# Patient Record
Sex: MALE | URBAN - METROPOLITAN AREA
[De-identification: names, ages, dates, MRNs, and addresses within clinical notes are randomized per-mention and may not be internally consistent; named-entity substitution may affect disease eponyms.]

---

## 2018-12-04 ENCOUNTER — APPOINTMENT (RX ONLY)
Dept: URBAN - METROPOLITAN AREA CLINIC 149 | Facility: CLINIC | Age: 73
Setting detail: DERMATOLOGY
End: 2018-12-04

## 2018-12-04 DIAGNOSIS — L82.1 OTHER SEBORRHEIC KERATOSIS: ICD-10-CM

## 2018-12-04 DIAGNOSIS — D22 MELANOCYTIC NEVI: ICD-10-CM

## 2018-12-04 DIAGNOSIS — Z85.828 PERSONAL HISTORY OF OTHER MALIGNANT NEOPLASM OF SKIN: ICD-10-CM | Status: RESOLVED

## 2018-12-04 DIAGNOSIS — L81.4 OTHER MELANIN HYPERPIGMENTATION: ICD-10-CM

## 2018-12-04 DIAGNOSIS — D18.0 HEMANGIOMA: ICD-10-CM

## 2018-12-04 DIAGNOSIS — L57.0 ACTINIC KERATOSIS: ICD-10-CM

## 2018-12-04 PROBLEM — D22.5 MELANOCYTIC NEVI OF TRUNK: Status: ACTIVE | Noted: 2018-12-04

## 2018-12-04 PROBLEM — D18.01 HEMANGIOMA OF SKIN AND SUBCUTANEOUS TISSUE: Status: ACTIVE | Noted: 2018-12-04

## 2018-12-04 PROCEDURE — 17003 DESTRUCT PREMALG LES 2-14: CPT

## 2018-12-04 PROCEDURE — 17000 DESTRUCT PREMALG LESION: CPT

## 2018-12-04 PROCEDURE — ? SUNSCREEN RECOMMENDATIONS

## 2018-12-04 PROCEDURE — ? LIQUID NITROGEN

## 2018-12-04 PROCEDURE — 99214 OFFICE O/P EST MOD 30 MIN: CPT | Mod: 25

## 2018-12-04 PROCEDURE — ? COUNSELING

## 2018-12-04 ASSESSMENT — LOCATION SIMPLE DESCRIPTION DERM
LOCATION SIMPLE: LEFT TEMPLE
LOCATION SIMPLE: ABDOMEN
LOCATION SIMPLE: RIGHT POSTAURICULAR SKIN
LOCATION SIMPLE: LEFT CHEEK
LOCATION SIMPLE: LEFT UPPER BACK
LOCATION SIMPLE: RIGHT SHOULDER
LOCATION SIMPLE: SCALP

## 2018-12-04 ASSESSMENT — LOCATION DETAILED DESCRIPTION DERM
LOCATION DETAILED: RIGHT CENTRAL POSTAURICULAR SKIN
LOCATION DETAILED: PERIUMBILICAL SKIN
LOCATION DETAILED: RIGHT INFERIOR POSTAURICULAR SKIN
LOCATION DETAILED: EPIGASTRIC SKIN
LOCATION DETAILED: LEFT CENTRAL MALAR CHEEK
LOCATION DETAILED: RIGHT POSTERIOR SHOULDER
LOCATION DETAILED: LEFT SUPERIOR LATERAL UPPER BACK
LOCATION DETAILED: LEFT LATERAL TEMPLE

## 2018-12-04 ASSESSMENT — LOCATION ZONE DERM
LOCATION ZONE: ARM
LOCATION ZONE: TRUNK
LOCATION ZONE: SCALP
LOCATION ZONE: FACE

## 2018-12-04 NOTE — PROCEDURE: LIQUID NITROGEN
Duration Of Freeze Thaw-Cycle (Seconds): 0
Consent: The patient's consent was obtained including but not limited to risks of crusting, scabbing, blistering, scarring, darker or lighter pigmentary change, recurrence, incomplete removal and infection.
Number Of Freeze-Thaw Cycles: 2 freeze-thaw cycles
Render Post-Care Instructions In Note?: yes
Detail Level: Simple
Post-Care Instructions: I reviewed with the patient in detail post-care instructions. Patient is to wear sunprotection, and avoid picking at any of the treated lesions. Pt may apply Vaseline to crusted or scabbing areas.

## 2019-04-08 ENCOUNTER — APPOINTMENT (RX ONLY)
Dept: URBAN - METROPOLITAN AREA CLINIC 149 | Facility: CLINIC | Age: 74
Setting detail: DERMATOLOGY
End: 2019-04-08

## 2019-04-08 DIAGNOSIS — L57.0 ACTINIC KERATOSIS: ICD-10-CM

## 2019-04-08 DIAGNOSIS — Z85.828 PERSONAL HISTORY OF OTHER MALIGNANT NEOPLASM OF SKIN: ICD-10-CM | Status: RESOLVED

## 2019-04-08 DIAGNOSIS — D18.0 HEMANGIOMA: ICD-10-CM

## 2019-04-08 DIAGNOSIS — D22 MELANOCYTIC NEVI: ICD-10-CM

## 2019-04-08 DIAGNOSIS — L82.1 OTHER SEBORRHEIC KERATOSIS: ICD-10-CM

## 2019-04-08 DIAGNOSIS — L81.4 OTHER MELANIN HYPERPIGMENTATION: ICD-10-CM

## 2019-04-08 PROBLEM — D22.9 MELANOCYTIC NEVI, UNSPECIFIED: Status: ACTIVE | Noted: 2019-04-08

## 2019-04-08 PROBLEM — D18.01 HEMANGIOMA OF SKIN AND SUBCUTANEOUS TISSUE: Status: ACTIVE | Noted: 2019-04-08

## 2019-04-08 PROCEDURE — 99214 OFFICE O/P EST MOD 30 MIN: CPT | Mod: 25

## 2019-04-08 PROCEDURE — 17004 DESTROY PREMAL LESIONS 15/>: CPT

## 2019-04-08 PROCEDURE — ? SUNSCREEN RECOMMENDATIONS

## 2019-04-08 PROCEDURE — ? PRESCRIPTION

## 2019-04-08 PROCEDURE — ? COUNSELING

## 2019-04-08 PROCEDURE — ? LIQUID NITROGEN

## 2019-04-08 RX ORDER — FLUOROURACIL 20 MG/ML
SOLUTION TOPICAL
Qty: 2 | Refills: 0 | Status: ERX | COMMUNITY
Start: 2019-04-08

## 2019-04-08 RX ADMIN — FLUOROURACIL: 20 SOLUTION TOPICAL at 00:00

## 2019-04-08 ASSESSMENT — LOCATION DETAILED DESCRIPTION DERM
LOCATION DETAILED: LEFT LATERAL FRONTAL SCALP
LOCATION DETAILED: LEFT FOREHEAD
LOCATION DETAILED: LEFT CENTRAL TEMPLE
LOCATION DETAILED: SUPERIOR MID FOREHEAD
LOCATION DETAILED: LEFT MEDIAL TEMPLE
LOCATION DETAILED: LEFT SUPERIOR FOREHEAD
LOCATION DETAILED: LEFT CENTRAL FRONTAL SCALP
LOCATION DETAILED: LEFT MEDIAL ZYGOMA
LOCATION DETAILED: MEDIAL FRONTAL SCALP
LOCATION DETAILED: LEFT LATERAL ZYGOMA

## 2019-04-08 ASSESSMENT — LOCATION SIMPLE DESCRIPTION DERM
LOCATION SIMPLE: FRONTAL SCALP
LOCATION SIMPLE: SUPERIOR FOREHEAD
LOCATION SIMPLE: LEFT TEMPLE
LOCATION SIMPLE: LEFT ZYGOMA
LOCATION SIMPLE: LEFT FOREHEAD
LOCATION SIMPLE: LEFT SCALP

## 2019-04-08 ASSESSMENT — LOCATION ZONE DERM
LOCATION ZONE: FACE
LOCATION ZONE: SCALP

## 2019-04-08 NOTE — PROCEDURE: LIQUID NITROGEN
Render Post-Care Instructions In Note?: yes
Consent: The patient's consent was obtained including but not limited to risks of crusting, scabbing, blistering, scarring, darker or lighter pigmentary change, recurrence, incomplete removal and infection.
Number Of Freeze-Thaw Cycles: 2 freeze-thaw cycles
Detail Level: Detailed
Post-Care Instructions: I reviewed with the patient in detail post-care instructions. Patient is to wear sunprotection, and avoid picking at any of the treated lesions. Pt may apply Vaseline to crusted or scabbing areas.
Duration Of Freeze Thaw-Cycle (Seconds): 0

## 2020-01-16 ENCOUNTER — APPOINTMENT (RX ONLY)
Dept: URBAN - METROPOLITAN AREA CLINIC 149 | Facility: CLINIC | Age: 75
Setting detail: DERMATOLOGY
End: 2020-01-16

## 2020-01-16 DIAGNOSIS — D22 MELANOCYTIC NEVI: ICD-10-CM

## 2020-01-16 DIAGNOSIS — L81.4 OTHER MELANIN HYPERPIGMENTATION: ICD-10-CM

## 2020-01-16 DIAGNOSIS — Z85.828 PERSONAL HISTORY OF OTHER MALIGNANT NEOPLASM OF SKIN: ICD-10-CM

## 2020-01-16 DIAGNOSIS — L82.1 OTHER SEBORRHEIC KERATOSIS: ICD-10-CM

## 2020-01-16 DIAGNOSIS — D18.0 HEMANGIOMA: ICD-10-CM

## 2020-01-16 DIAGNOSIS — L57.0 ACTINIC KERATOSIS: ICD-10-CM

## 2020-01-16 PROBLEM — D22.9 MELANOCYTIC NEVI, UNSPECIFIED: Status: ACTIVE | Noted: 2020-01-16

## 2020-01-16 PROBLEM — D18.01 HEMANGIOMA OF SKIN AND SUBCUTANEOUS TISSUE: Status: ACTIVE | Noted: 2020-01-16

## 2020-01-16 PROCEDURE — ? SUNSCREEN RECOMMENDATIONS

## 2020-01-16 PROCEDURE — ? COUNSELING

## 2020-01-16 PROCEDURE — 99214 OFFICE O/P EST MOD 30 MIN: CPT | Mod: 25

## 2020-01-16 PROCEDURE — ? LIQUID NITROGEN

## 2020-01-16 PROCEDURE — 17004 DESTROY PREMAL LESIONS 15/>: CPT

## 2020-01-16 ASSESSMENT — LOCATION ZONE DERM
LOCATION ZONE: SCALP
LOCATION ZONE: ARM
LOCATION ZONE: FACE
LOCATION ZONE: NOSE

## 2020-01-16 ASSESSMENT — LOCATION DETAILED DESCRIPTION DERM
LOCATION DETAILED: LEFT SUPERIOR FOREHEAD
LOCATION DETAILED: RIGHT MEDIAL FRONTAL SCALP
LOCATION DETAILED: RIGHT DISTAL DORSAL FOREARM
LOCATION DETAILED: NASAL SUPRATIP
LOCATION DETAILED: LEFT INFERIOR FOREHEAD
LOCATION DETAILED: RIGHT CENTRAL FRONTAL SCALP
LOCATION DETAILED: RIGHT CENTRAL PARIETAL SCALP
LOCATION DETAILED: LEFT DORSAL WRIST
LOCATION DETAILED: LEFT INFERIOR CENTRAL MALAR CHEEK
LOCATION DETAILED: LEFT CENTRAL PARIETAL SCALP
LOCATION DETAILED: LEFT CENTRAL FRONTAL SCALP
LOCATION DETAILED: LEFT LATERAL MALAR CHEEK
LOCATION DETAILED: RIGHT LATERAL FOREHEAD
LOCATION DETAILED: LEFT MEDIAL TEMPLE
LOCATION DETAILED: LEFT SUPERIOR CENTRAL MALAR CHEEK
LOCATION DETAILED: RIGHT SUPERIOR PARIETAL SCALP
LOCATION DETAILED: RIGHT DORSAL WRIST
LOCATION DETAILED: LEFT SUPERIOR PARIETAL SCALP

## 2020-01-16 ASSESSMENT — LOCATION SIMPLE DESCRIPTION DERM
LOCATION SIMPLE: SCALP
LOCATION SIMPLE: RIGHT WRIST
LOCATION SIMPLE: RIGHT FOREHEAD
LOCATION SIMPLE: LEFT WRIST
LOCATION SIMPLE: RIGHT SCALP
LOCATION SIMPLE: LEFT FOREHEAD
LOCATION SIMPLE: LEFT SCALP
LOCATION SIMPLE: LEFT TEMPLE
LOCATION SIMPLE: LEFT CHEEK
LOCATION SIMPLE: RIGHT FOREARM
LOCATION SIMPLE: NOSE

## 2020-01-16 NOTE — PROCEDURE: LIQUID NITROGEN
Detail Level: Simple
Render Note In Bullet Format When Appropriate: No
Number Of Freeze-Thaw Cycles: 2 freeze-thaw cycles
Consent: The patient's consent was obtained including but not limited to risks of crusting, scabbing, blistering, scarring, darker or lighter pigmentary change, recurrence, incomplete removal and infection.
Post-Care Instructions: I reviewed with the patient in detail post-care instructions. Patient is to wear sunprotection, and avoid picking at any of the treated lesions. Pt may apply Vaseline to crusted or scabbing areas.
Duration Of Freeze Thaw-Cycle (Seconds): 3

## 2020-11-17 ENCOUNTER — APPOINTMENT (RX ONLY)
Dept: URBAN - METROPOLITAN AREA CLINIC 149 | Facility: CLINIC | Age: 75
Setting detail: DERMATOLOGY
End: 2020-11-17

## 2020-11-17 VITALS — TEMPERATURE: 96.5 F

## 2020-11-17 DIAGNOSIS — L82.1 OTHER SEBORRHEIC KERATOSIS: ICD-10-CM

## 2020-11-17 DIAGNOSIS — L81.4 OTHER MELANIN HYPERPIGMENTATION: ICD-10-CM

## 2020-11-17 DIAGNOSIS — D18.0 HEMANGIOMA: ICD-10-CM

## 2020-11-17 DIAGNOSIS — D22 MELANOCYTIC NEVI: ICD-10-CM

## 2020-11-17 DIAGNOSIS — L57.0 ACTINIC KERATOSIS: ICD-10-CM

## 2020-11-17 DIAGNOSIS — Z85.828 PERSONAL HISTORY OF OTHER MALIGNANT NEOPLASM OF SKIN: ICD-10-CM | Status: RESOLVED

## 2020-11-17 PROBLEM — D22.5 MELANOCYTIC NEVI OF TRUNK: Status: ACTIVE | Noted: 2020-11-17

## 2020-11-17 PROBLEM — D18.01 HEMANGIOMA OF SKIN AND SUBCUTANEOUS TISSUE: Status: ACTIVE | Noted: 2020-11-17

## 2020-11-17 PROCEDURE — 17000 DESTRUCT PREMALG LESION: CPT

## 2020-11-17 PROCEDURE — 17003 DESTRUCT PREMALG LES 2-14: CPT

## 2020-11-17 PROCEDURE — 99213 OFFICE O/P EST LOW 20 MIN: CPT | Mod: 25

## 2020-11-17 PROCEDURE — ? LIQUID NITROGEN

## 2020-11-17 PROCEDURE — ? SUNSCREEN RECOMMENDATIONS

## 2020-11-17 PROCEDURE — ? FULL BODY SKIN EXAM

## 2020-11-17 PROCEDURE — ? COUNSELING

## 2020-11-17 PROCEDURE — ? ADDITIONAL NOTES

## 2020-11-17 ASSESSMENT — LOCATION SIMPLE DESCRIPTION DERM
LOCATION SIMPLE: RIGHT UPPER BACK
LOCATION SIMPLE: POSTERIOR SCALP
LOCATION SIMPLE: LEFT UPPER BACK
LOCATION SIMPLE: LEFT FOREHEAD
LOCATION SIMPLE: RIGHT FOREARM
LOCATION SIMPLE: RIGHT SCALP
LOCATION SIMPLE: SCALP
LOCATION SIMPLE: RIGHT SHOULDER
LOCATION SIMPLE: RIGHT BREAST
LOCATION SIMPLE: LEFT FOREARM

## 2020-11-17 ASSESSMENT — LOCATION DETAILED DESCRIPTION DERM
LOCATION DETAILED: RIGHT MEDIAL FRONTAL SCALP
LOCATION DETAILED: LEFT PROXIMAL DORSAL FOREARM
LOCATION DETAILED: LEFT SUPERIOR PARIETAL SCALP
LOCATION DETAILED: RIGHT INFRAMAMMARY CREASE (INNER QUADRANT)
LOCATION DETAILED: LEFT SUPERIOR FOREHEAD
LOCATION DETAILED: RIGHT PROXIMAL DORSAL FOREARM
LOCATION DETAILED: LEFT SUPERIOR LATERAL FOREHEAD
LOCATION DETAILED: POSTERIOR MID-PARIETAL SCALP
LOCATION DETAILED: RIGHT ANTERIOR SHOULDER
LOCATION DETAILED: RIGHT SUPERIOR UPPER BACK
LOCATION DETAILED: RIGHT SUPERIOR PARIETAL SCALP
LOCATION DETAILED: LEFT SUPERIOR UPPER BACK

## 2020-11-17 ASSESSMENT — LOCATION ZONE DERM
LOCATION ZONE: SCALP
LOCATION ZONE: ARM
LOCATION ZONE: TRUNK
LOCATION ZONE: FACE

## 2020-11-17 NOTE — PROCEDURE: LIQUID NITROGEN
Render Note In Bullet Format When Appropriate: No
Detail Level: Detailed
Number Of Freeze-Thaw Cycles: 2 freeze-thaw cycles
Post-Care Instructions: I reviewed with the patient in detail post-care instructions. Patient is to wear sunprotection, and avoid picking at any of the treated lesions. Pt may apply Vaseline to crusted or scabbing areas.
Render Post-Care Instructions In Note?: yes
Consent: The patient's consent was obtained including but not limited to risks of crusting, scabbing, blistering, scarring, darker or lighter pigmentary change, recurrence, incomplete removal and infection.
Duration Of Freeze Thaw-Cycle (Seconds): 0

## 2021-03-30 ENCOUNTER — APPOINTMENT (RX ONLY)
Dept: URBAN - METROPOLITAN AREA CLINIC 149 | Facility: CLINIC | Age: 76
Setting detail: DERMATOLOGY
End: 2021-03-30

## 2021-03-30 VITALS — TEMPERATURE: 94.7 F

## 2021-03-30 DIAGNOSIS — L81.4 OTHER MELANIN HYPERPIGMENTATION: ICD-10-CM

## 2021-03-30 DIAGNOSIS — B07.8 OTHER VIRAL WARTS: ICD-10-CM

## 2021-03-30 DIAGNOSIS — L57.0 ACTINIC KERATOSIS: ICD-10-CM

## 2021-03-30 DIAGNOSIS — L82.1 OTHER SEBORRHEIC KERATOSIS: ICD-10-CM

## 2021-03-30 PROCEDURE — ? FULL BODY SKIN EXAM - DECLINED

## 2021-03-30 PROCEDURE — ? COUNSELING

## 2021-03-30 PROCEDURE — 17110 DESTRUCTION B9 LES UP TO 14: CPT

## 2021-03-30 PROCEDURE — ? ADDITIONAL NOTES

## 2021-03-30 PROCEDURE — 99213 OFFICE O/P EST LOW 20 MIN: CPT | Mod: 25

## 2021-03-30 PROCEDURE — ? LIQUID NITROGEN

## 2021-03-30 PROCEDURE — 17000 DESTRUCT PREMALG LESION: CPT | Mod: 59

## 2021-03-30 PROCEDURE — 17003 DESTRUCT PREMALG LES 2-14: CPT | Mod: 59

## 2021-03-30 ASSESSMENT — LOCATION SIMPLE DESCRIPTION DERM
LOCATION SIMPLE: LEFT EYEBROW
LOCATION SIMPLE: LEFT OCCIPITAL SCALP
LOCATION SIMPLE: LEFT FOREHEAD
LOCATION SIMPLE: SCALP
LOCATION SIMPLE: LEFT SCALP
LOCATION SIMPLE: RIGHT CHEEK
LOCATION SIMPLE: POSTERIOR SCALP
LOCATION SIMPLE: RIGHT FOREHEAD

## 2021-03-30 ASSESSMENT — LOCATION DETAILED DESCRIPTION DERM
LOCATION DETAILED: LEFT LATERAL EYEBROW
LOCATION DETAILED: LEFT SUPERIOR MEDIAL FOREHEAD
LOCATION DETAILED: RIGHT SUPERIOR MEDIAL MALAR CHEEK
LOCATION DETAILED: RIGHT SUPERIOR PARIETAL SCALP
LOCATION DETAILED: RIGHT INFERIOR FOREHEAD
LOCATION DETAILED: RIGHT INFERIOR CENTRAL MALAR CHEEK
LOCATION DETAILED: LEFT SUPERIOR PARIETAL SCALP
LOCATION DETAILED: LEFT MEDIAL FRONTAL SCALP
LOCATION DETAILED: LEFT FOREHEAD
LOCATION DETAILED: MID-OCCIPITAL SCALP
LOCATION DETAILED: LEFT SUPERIOR OCCIPITAL SCALP

## 2021-03-30 ASSESSMENT — LOCATION ZONE DERM
LOCATION ZONE: SCALP
LOCATION ZONE: FACE

## 2021-03-30 NOTE — PROCEDURE: LIQUID NITROGEN
Post-Care Instructions: I reviewed with the patient in detail post-care instructions. Patient is to wear sunprotection, and avoid picking at any of the treated lesions. Pt may apply Vaseline to crusted or scabbing areas.
Render Post-Care Instructions In Note?: yes
Detail Level: Detailed
Consent: The patient's consent was obtained including but not limited to risks of crusting, scabbing, blistering, scarring, darker or lighter pigmentary change, recurrence, incomplete removal and infection.
Number Of Freeze-Thaw Cycles: 2 freeze-thaw cycles
Render Note In Bullet Format When Appropriate: No
Duration Of Freeze Thaw-Cycle (Seconds): 0
Duration Of Freeze Thaw-Cycle (Seconds): 3
Detail Level: Simple
Medical Necessity Information: It is in your best interest to select a reason for this procedure from the list below. All of these items fulfill various CMS LCD requirements except the new and changing color options.
Medical Necessity Clause: This procedure was medically necessary because the lesions that were treated were: at risk for and/or subject to recurrent physical trauma as a result of lesion type and location with history of the same, bleeding and irritated.

## 2021-12-08 ENCOUNTER — APPOINTMENT (RX ONLY)
Dept: URBAN - METROPOLITAN AREA CLINIC 149 | Facility: CLINIC | Age: 76
Setting detail: DERMATOLOGY
End: 2021-12-08

## 2021-12-08 DIAGNOSIS — D22 MELANOCYTIC NEVI: ICD-10-CM

## 2021-12-08 DIAGNOSIS — L82.1 OTHER SEBORRHEIC KERATOSIS: ICD-10-CM

## 2021-12-08 DIAGNOSIS — L81.4 OTHER MELANIN HYPERPIGMENTATION: ICD-10-CM

## 2021-12-08 DIAGNOSIS — Z85.828 PERSONAL HISTORY OF OTHER MALIGNANT NEOPLASM OF SKIN: ICD-10-CM

## 2021-12-08 DIAGNOSIS — L57.0 ACTINIC KERATOSIS: ICD-10-CM

## 2021-12-08 DIAGNOSIS — D18.0 HEMANGIOMA: ICD-10-CM

## 2021-12-08 DIAGNOSIS — L82.0 INFLAMED SEBORRHEIC KERATOSIS: ICD-10-CM

## 2021-12-08 PROBLEM — D18.01 HEMANGIOMA OF SKIN AND SUBCUTANEOUS TISSUE: Status: ACTIVE | Noted: 2021-12-08

## 2021-12-08 PROBLEM — D22.5 MELANOCYTIC NEVI OF TRUNK: Status: ACTIVE | Noted: 2021-12-08

## 2021-12-08 PROCEDURE — 17110 DESTRUCTION B9 LES UP TO 14: CPT | Mod: 59

## 2021-12-08 PROCEDURE — 17004 DESTROY PREMAL LESIONS 15/>: CPT

## 2021-12-08 PROCEDURE — ? ADDITIONAL NOTES

## 2021-12-08 PROCEDURE — ? COUNSELING

## 2021-12-08 PROCEDURE — ? LIQUID NITROGEN

## 2021-12-08 PROCEDURE — 99213 OFFICE O/P EST LOW 20 MIN: CPT | Mod: 25

## 2021-12-08 ASSESSMENT — LOCATION ZONE DERM
LOCATION ZONE: FACE
LOCATION ZONE: EAR
LOCATION ZONE: TRUNK
LOCATION ZONE: SCALP
LOCATION ZONE: NECK

## 2021-12-08 ASSESSMENT — LOCATION SIMPLE DESCRIPTION DERM
LOCATION SIMPLE: POSTERIOR SCALP
LOCATION SIMPLE: CHEST
LOCATION SIMPLE: LEFT CHEEK
LOCATION SIMPLE: LEFT ANTERIOR NECK
LOCATION SIMPLE: LEFT CLAVICULAR SKIN
LOCATION SIMPLE: RIGHT CHEEK
LOCATION SIMPLE: ABDOMEN
LOCATION SIMPLE: SCALP
LOCATION SIMPLE: RIGHT SCALP
LOCATION SIMPLE: LEFT UPPER BACK
LOCATION SIMPLE: LEFT FOREHEAD
LOCATION SIMPLE: LEFT SCALP
LOCATION SIMPLE: RIGHT CLAVICULAR SKIN
LOCATION SIMPLE: SUPERIOR FOREHEAD
LOCATION SIMPLE: RIGHT ANTERIOR NECK
LOCATION SIMPLE: RIGHT EAR

## 2021-12-08 ASSESSMENT — LOCATION DETAILED DESCRIPTION DERM
LOCATION DETAILED: LEFT LATERAL FRONTAL SCALP
LOCATION DETAILED: LEFT CLAVICULAR SKIN
LOCATION DETAILED: RIGHT MEDIAL FRONTAL SCALP
LOCATION DETAILED: RIGHT CLAVICULAR SKIN
LOCATION DETAILED: RIGHT CENTRAL FRONTAL SCALP
LOCATION DETAILED: RIGHT MEDIAL SUPERIOR CHEST
LOCATION DETAILED: MIDDLE STERNUM
LOCATION DETAILED: LEFT MID-UPPER BACK
LOCATION DETAILED: RIGHT SUPERIOR HELIX
LOCATION DETAILED: LEFT SUPERIOR MEDIAL FOREHEAD
LOCATION DETAILED: LEFT CENTRAL FRONTAL SCALP
LOCATION DETAILED: SUPERIOR MID FOREHEAD
LOCATION DETAILED: EPIGASTRIC SKIN
LOCATION DETAILED: MID-OCCIPITAL SCALP
LOCATION DETAILED: LEFT INFERIOR LATERAL FOREHEAD
LOCATION DETAILED: RIGHT INFERIOR LATERAL NECK
LOCATION DETAILED: LEFT INFERIOR FOREHEAD
LOCATION DETAILED: LEFT SUPERIOR FRONTAL SCALP
LOCATION DETAILED: RIGHT CENTRAL MALAR CHEEK
LOCATION DETAILED: RIGHT POSTERIOR PARIETAL SCALP
LOCATION DETAILED: RIGHT CLAVICULAR NECK
LOCATION DETAILED: LEFT CLAVICULAR NECK
LOCATION DETAILED: POSTERIOR MID-PARIETAL SCALP
LOCATION DETAILED: LEFT CENTRAL MALAR CHEEK

## 2021-12-08 NOTE — PROCEDURE: LIQUID NITROGEN
Render Note In Bullet Format When Appropriate: No
Show Applicator Variable?: Yes
Post-Care Instructions: I reviewed with the patient in detail post-care instructions. Patient is to wear sunprotection, and avoid picking at any of the treated lesions. Pt may apply Vaseline to crusted or scabbing areas.
Duration Of Freeze Thaw-Cycle (Seconds): 0
Consent: The patient's consent was obtained including but not limited to risks of crusting, scabbing, blistering, scarring, darker or lighter pigmentary change, recurrence, incomplete removal and infection.
Detail Level: Detailed
Medical Necessity Information: It is in your best interest to select a reason for this procedure from the list below. All of these items fulfill various CMS LCD requirements except the new and changing color options.
Medical Necessity Clause: This procedure was medically necessary because the lesions that were treated were:

## 2022-12-06 ENCOUNTER — APPOINTMENT (RX ONLY)
Dept: URBAN - METROPOLITAN AREA CLINIC 333 | Facility: CLINIC | Age: 77
Setting detail: DERMATOLOGY
End: 2022-12-06

## 2022-12-06 DIAGNOSIS — Z12.83 ENCOUNTER FOR SCREENING FOR MALIGNANT NEOPLASM OF SKIN: ICD-10-CM

## 2022-12-06 DIAGNOSIS — L57.0 ACTINIC KERATOSIS: ICD-10-CM

## 2022-12-06 DIAGNOSIS — D22 MELANOCYTIC NEVI: ICD-10-CM

## 2022-12-06 DIAGNOSIS — Z85.828 PERSONAL HISTORY OF OTHER MALIGNANT NEOPLASM OF SKIN: ICD-10-CM

## 2022-12-06 DIAGNOSIS — L82.1 OTHER SEBORRHEIC KERATOSIS: ICD-10-CM

## 2022-12-06 DIAGNOSIS — L57.8 OTHER SKIN CHANGES DUE TO CHRONIC EXPOSURE TO NONIONIZING RADIATION: ICD-10-CM

## 2022-12-06 DIAGNOSIS — L81.4 OTHER MELANIN HYPERPIGMENTATION: ICD-10-CM

## 2022-12-06 PROBLEM — D48.5 NEOPLASM OF UNCERTAIN BEHAVIOR OF SKIN: Status: ACTIVE | Noted: 2022-12-06

## 2022-12-06 PROBLEM — D22.5 MELANOCYTIC NEVI OF TRUNK: Status: ACTIVE | Noted: 2022-12-06

## 2022-12-06 PROCEDURE — 11102 TANGNTL BX SKIN SINGLE LES: CPT

## 2022-12-06 PROCEDURE — ? SHAVE REMOVAL

## 2022-12-06 PROCEDURE — ? COUNSELING

## 2022-12-06 PROCEDURE — 17003 DESTRUCT PREMALG LES 2-14: CPT

## 2022-12-06 PROCEDURE — ? LIQUID NITROGEN

## 2022-12-06 PROCEDURE — 11300 SHAVE SKIN LESION 0.5 CM/<: CPT | Mod: 59

## 2022-12-06 PROCEDURE — ? BIOPSY BY SHAVE METHOD

## 2022-12-06 PROCEDURE — 17000 DESTRUCT PREMALG LESION: CPT | Mod: 59

## 2022-12-06 PROCEDURE — ? TREATMENT REGIMEN

## 2022-12-06 PROCEDURE — ? ORDER FOR PHOTODYNAMIC THERAPY

## 2022-12-06 PROCEDURE — 99213 OFFICE O/P EST LOW 20 MIN: CPT | Mod: 25

## 2022-12-06 PROCEDURE — ? FULL BODY SKIN EXAM

## 2022-12-06 ASSESSMENT — LOCATION SIMPLE DESCRIPTION DERM
LOCATION SIMPLE: LEFT LIP
LOCATION SIMPLE: RIGHT UPPER BACK
LOCATION SIMPLE: RIGHT CHEEK
LOCATION SIMPLE: SCALP
LOCATION SIMPLE: RIGHT SCALP
LOCATION SIMPLE: RIGHT SHOULDER
LOCATION SIMPLE: LEFT FOREHEAD
LOCATION SIMPLE: LEFT OCCIPITAL SCALP
LOCATION SIMPLE: CHEST
LOCATION SIMPLE: LEFT SCALP
LOCATION SIMPLE: UPPER BACK

## 2022-12-06 ASSESSMENT — LOCATION DETAILED DESCRIPTION DERM
LOCATION DETAILED: MIDDLE STERNUM
LOCATION DETAILED: LEFT LOWER CUTANEOUS LIP
LOCATION DETAILED: RIGHT MEDIAL FRONTAL SCALP
LOCATION DETAILED: LEFT SUPERIOR PARIETAL SCALP
LOCATION DETAILED: LEFT SUPERIOR FOREHEAD
LOCATION DETAILED: RIGHT POSTERIOR SHOULDER
LOCATION DETAILED: LEFT MEDIAL SUPERIOR CHEST
LOCATION DETAILED: RIGHT CENTRAL MALAR CHEEK
LOCATION DETAILED: INFERIOR THORACIC SPINE
LOCATION DETAILED: RIGHT SUPERIOR MEDIAL UPPER BACK
LOCATION DETAILED: LEFT SUPERIOR OCCIPITAL SCALP
LOCATION DETAILED: LEFT LATERAL FOREHEAD
LOCATION DETAILED: RIGHT SUPERIOR PARIETAL SCALP
LOCATION DETAILED: LEFT MEDIAL FRONTAL SCALP
LOCATION DETAILED: RIGHT CENTRAL FRONTAL SCALP

## 2022-12-06 ASSESSMENT — LOCATION ZONE DERM
LOCATION ZONE: LIP
LOCATION ZONE: ARM
LOCATION ZONE: SCALP
LOCATION ZONE: FACE
LOCATION ZONE: TRUNK

## 2022-12-06 NOTE — PROCEDURE: ORDER FOR PHOTODYNAMIC THERAPY
Back Incubation Time: 2 Hours
Detail Level: Zone
Photosensitizer: Levulan
Debridement: Yes
Occlusion: No
Pdt Type: JAYDE-U
Frequency Of Pdt: Single Treatment
Location: Face and Scalp
Face And Ears Incubation Time: 1 Hour
Consent: The procedure and risks were reviewed with the patient including but not limited to: burning, pigmentary changes, pain, blistering, scabbing, redness, and the possibility of needing numerous treatments. Strict photoprotection after the procedure was also discussed.
Face Incubation Time: 45
Arms And Hands Incubation Time: 90 minutes
Face And Scalp Incubation Time: 45 mins for each

## 2022-12-06 NOTE — PROCEDURE: LIQUID NITROGEN
Detail Level: Simple
Duration Of Freeze Thaw-Cycle (Seconds): 7
Post-Care Instructions: Aftercare Cryosurgery\\nYou have just had cryotherapy for the treatment of a pre-cancerous or other benign (non-cancerous) skin lesions. You can expect the pain to rapidly decrease over the next 45 minutes. The area treated will initially turn red and over the next 72 hours turn brown to black. A scab will form that will peel off by itself within 5 to 7 days. A blister or reddish purple blood blister may form where the area has been treated. When the scab forms, you can apply Vaseline or Scar Recovery Gel twice a day to the wound. This product will improve the healing of the wound, decreasing the appearance of red or pink pigment changes in the wound. The gel has also been shown to significantly decrease itching while the wound heals. You can purchase the Scar Recovery Gel at our office.\\nCan I wash or use makeup? Yes\\nShould I break the blister should one form?\\nIf the blister is bothersome, you may break the blister with a clean needle if the lesion is on the body. However, we do not recommend doing this for lesions on the face. Keep the area dry and as clean as possible in order to prevent infection. Natural skin is the best protection to prevent infection.\\nWill this leave a scar?\\nIt could, but it is very unlikely. However, it may leave a slight discoloration, which should be temporary.\\nWhat if the skin growth doesn't go away after this has healed?\\nThe providers treated this area believing it did not have cancerous roots and was strictly limited to the surface of the skin as a pre-cancerous or benign growth would be. The growth may not disappear or it may return over a period of time. You need to have this area checked again at your follow-up appointment to ensure that it does not need further treatment or that it has resolved.
Show Aperture Variable?: Yes
Render Post-Care Instructions In Note?: no
Consent: The patient's verbal consent was obtained including but not limited to risks of crusting, scabbing, blistering, scarring, darker or lighter pigmentary change, recurrence, incomplete removal and infection.
Number Of Freeze-Thaw Cycles: 1 freeze-thaw cycle

## 2022-12-06 NOTE — PROCEDURE: SHAVE REMOVAL
Medical Necessity Information: It is in your best interest to select a reason for this procedure from the list below. All of these items fulfill various CMS LCD requirements except the new and changing color options.
Medical Necessity Clause: This procedure was medically necessary because the lesion that was treated was:
Lab: 6
Lab Facility: 3
Detail Level: Detailed
Was A Bandage Applied: Yes
Size Of Lesion In Cm (Required): 0.4
X Size Of Lesion In Cm (Optional): 0
Depth Of Shave: dermis
Biopsy Method: Personna blade
Anesthesia Type: 1% lidocaine with epinephrine
Hemostasis: Electrocautery
Wound Care: Vaseline
Render Path Notes In Note?: No
Consent was obtained from the patient. The risks and benefits to therapy were discussed in detail. Specifically, the risks of infection, scarring, bleeding, prolonged wound healing, incomplete removal, allergy to anesthesia, nerve injury and recurrence were addressed. Prior to the procedure, the treatment site was clearly identified and confirmed by the patient. All components of Universal Protocol/PAUSE Rule completed.
Post-Care Instructions: 1. Keep the wound dry and covered with a bandage for the first 24 hours after procedure. Thereafter, change the bandage twice a day. Gently clean the wound with water and then gently pat the wound dry. Gently apply a thick layer of Vaseline to the wound and cover with a bandage, 2 days after your biopsy. If the bandage gets wet, replace it with a dry bandage. For facial biopsies, the area may remain uncovered after 3-4 days. For other areas of the body, we recommend covering the wound for 7 days. After 7 days, the wound can remain uncovered.\\n\\n2. We do not recommend Neosporin in our practice due to the percentage of the population that can develop an allergy to it.\\n\\n3. Showering is fine; if the bandage gets wet, just replace it with a dry bandage (no soaking in a bathtub or a pool for 5 days). \\n \\n4. If bruising is a concern, Arnica is a supplement that minimizes bruising. To prevent bruising, the supplement can be taken once daily for 1 week prior to the procedure. This can also be taken starting on the same day as the procedure to minimize bruising and quicken the healing process if bruises do appear. If starting on the same day as the procedure, please follow instructions on the package. This supplement is available at local vitamin shops. \\n\\n5. Your results may take up to 14 days to come in. Our office will call you with results at this time. In some cases, the biopsy will indicate that more skin must be removed in order to remove abnormal cells. If this is the case, you will then be asked to return to the office for additional information and/or treatment.\\n  \\nOnce the wound has healed, to help with minimizing the scar, a product called Scar Recovery Gel can be applied twice a day to the area. This product will decrease the likelihood of the formation of a hypertrophic scar, and decrease the appearance of red or pink pigment changes in the scar. The gel has also been shown to significantly decrease itching while the wound heals.
Notification Instructions: Patient will be notified of pathology results. However, patient instructed to call the office if not contacted within 2 weeks.
Billing Type: Third-Party Bill

## 2022-12-06 NOTE — PROCEDURE: BIOPSY BY SHAVE METHOD
Detail Level: Detailed
Depth Of Biopsy: dermis
Was A Bandage Applied: Yes
Size Of Lesion In Cm: 0
Biopsy Type: H and E
Biopsy Method: Personna blade
Anesthesia Type: 1% lidocaine with epinephrine
Anesthesia Volume In Cc (Will Not Render If 0): 0.5
Hemostasis: Electrocautery
Wound Care: Vaseline
Dressing: bandage
Destruction After The Procedure: No
Type Of Destruction Used: Curettage
Curettage Text: The wound bed was treated with curettage after the biopsy was performed.
Cryotherapy Text: The wound bed was treated with cryotherapy after the biopsy was performed.
Electrodesiccation Text: The wound bed was treated with electrodesiccation after the biopsy was performed.
Electrodesiccation And Curettage Text: The wound bed was treated with electrodesiccation and curettage after the biopsy was performed.
Silver Nitrate Text: The wound bed was treated with silver nitrate after the biopsy was performed.
Lab: 6
Lab Facility: 3
Consent: Written consent was obtained and risks were reviewed including but not limited to scarring, infection, bleeding, scabbing, incomplete removal, nerve damage and allergy to anesthesia.
Post-Care Instructions: 1. Keep the wound dry and covered with a bandage for the first 24 hours after your biopsy. If the bandage gets wet, replace it with a dry bandage. After 48 hours, gently cleanse the wound with water and apply Vaseline. Do this twice a day. Please be careful not to dislodge the clot. After 7 days the wound can remain uncovered. Most scabs on the face do not have to be covered unless the scab may be dislodged or it may become wet. Vaseline should be applied twice a day until a scab forms. When the scab forms, you can apply Scar Recovery Gel twice a day to the wound (This is available for purchase in our office). This product will improve the healing of the wound, decreasing the likelihood of the formation of a hypertrophic scar and decreasing the appearance of red or pink pigment changes in the wound. The gel has also been shown to significantly decrease itching while the wound heals.\\n\\n2. We do not recommend Neosporin in our practice due to the percentage of the population that can develop an allergy to it.\\n\\n3. Showering is fine; if the bandage gets wet, just replace it with a dry bandage (no soaking in a bathtub or a pool).\\n\\n4. Your results will take 2-3 weeks to come in. \\n** Call us if you experience any problems or have any questions.**
Notification Instructions: Patient will be notified of biopsy results. However, patient instructed to call the office if not contacted within 2 weeks.
Billing Type: Third-Party Bill
Information: Selecting Yes will display possible errors in your note based on the variables you have selected. This validation is only offered as a suggestion for you. PLEASE NOTE THAT THE VALIDATION TEXT WILL BE REMOVED WHEN YOU FINALIZE YOUR NOTE. IF YOU WANT TO FAX A PRELIMINARY NOTE YOU WILL NEED TO TOGGLE THIS TO 'NO' IF YOU DO NOT WANT IT IN YOUR FAXED NOTE.

## 2022-12-06 NOTE — PROCEDURE: FULL BODY SKIN EXAM
Can you please respond    Thanks Frederick Marie
Instructions: This plan will send the code FBSE to the PM system.  DO NOT or CHANGE the price.
Price (Do Not Change): 0.00
Detail Level: Simple

## 2023-01-12 ENCOUNTER — APPOINTMENT (RX ONLY)
Dept: URBAN - METROPOLITAN AREA CLINIC 331 | Facility: CLINIC | Age: 78
Setting detail: DERMATOLOGY
End: 2023-01-12

## 2023-01-12 PROBLEM — C44.42 SQUAMOUS CELL CARCINOMA OF SKIN OF SCALP AND NECK: Status: ACTIVE | Noted: 2023-01-12

## 2023-01-12 PROCEDURE — 13121 CMPLX RPR S/A/L 2.6-7.5 CM: CPT

## 2023-01-12 PROCEDURE — ? MOHS SURGERY

## 2023-01-12 PROCEDURE — 17311 MOHS 1 STAGE H/N/HF/G: CPT

## 2023-01-12 PROCEDURE — ? PRESCRIPTION

## 2023-01-12 RX ORDER — MINOCYCLINE HYDROCHLORIDE 100 MG/1
CAPSULE ORAL 1
Qty: 14 | Refills: 0 | Status: ERX | COMMUNITY
Start: 2023-01-12

## 2023-01-12 RX ORDER — MUPIROCIN 20 MG/G
OINTMENT TOPICAL BID
Qty: 22 | Refills: 1 | Status: ERX | COMMUNITY
Start: 2023-01-12

## 2023-01-12 RX ORDER — ACETAMINOPHEN AND CODEINE PHOSPHATE 300; 30 MG/1; MG/1
TABLET ORAL
Qty: 12 | Refills: 0 | Status: ERX | COMMUNITY
Start: 2023-01-12

## 2023-01-12 RX ADMIN — ACETAMINOPHEN AND CODEINE PHOSPHATE: 300; 30 TABLET ORAL at 00:00

## 2023-01-12 RX ADMIN — MUPIROCIN: 20 OINTMENT TOPICAL at 00:00

## 2023-01-12 RX ADMIN — MINOCYCLINE HYDROCHLORIDE: 100 CAPSULE ORAL at 00:00

## 2023-01-12 NOTE — PROCEDURE: MOHS SURGERY
Post-Care Instructions: MOHS/ SURGERY POST-OPERATIVE INSTRUCTIONS\\n\\nWOUND CARE\\nKeep the bandage dry until 24 hours after surgery. Thereafter, change the bandage twice a day until sutures are removed. You may soak the bandage to help it peel off. Gently clean the wound with a Dial bar soap and water, and then gently pat the wound dry. Gently apply a thick layer of Vaseline to the wound, or Mupirocin if it has been prescribed to you. Cover the wound with a Band-Aid or non-stick gauze (e.g. Telfa®), and paper tape. Repeat this process daily until sutures are removed.\\n\\nWe do not recommend using over-the-counter antibiotic ointment given the high chance of developing allergic reactions in covered surgical wounds. Do not apply alcohol or hydrogen peroxide directly to the wound. \\n\\n \\n\\nFor hands, wrists, and forearms:\\nAfter surgery, you will be in a bulky dressing (bandage) with a velcro splint that goes from the hand to the middle of the forearm, with the fingers free. The splint is similar to a cast, however; the purpose of this splint is to decrease the mobility of your hand to prevent over working incision site. The splint protects the incision and the surgical repair, as well as lessen swelling. The splint can be removed and must be kept dry. When showering or bathing, remove bandage and the splint and refer to post op instructions for wound care.  Elevate your hand above your heart as much as possible to lessen swelling and pain. Pillows and blankets under the arm are helpful when you go to sleep.\\n\\n\\nBLEEDING\\nIt is fairly common for the incision to ooze or bleed, especially in the first several hours after surgery. This can be controlled by removing the bandage we applied and then applying constant, direct pressure to the bleeding site with a clean bandage or paper towel for 20 minutes (without peeking). If the bleeding continues, repeat the above procedure for an additional 20 minutes. If the incision continues to bleed after this time, call the office at [phone]. \\n\\nDISCOMFORT\\nIf you have discomfort following surgery, take two Extra Strength Tylenol® (total of 1,000 mg) every 6 hours OR a prescription pain medication if one has been prescribed to you. If this is not sufficient to control the pain, please call the office. AVOID medications that contain aspirin, ibuprofen, or naproxyn (e.g. Alleve®, Excedrin®, Bufferin®), as these products increase the risk of bleeding.\\n\\nSWELLING\\nLocal swelling is common after surgery. Apply an ice pack over the dressing – on for 20 minutes and off for 1 hour. Repeat until bedtime. You may continue this, if necessary, as long as the swelling persists. This will help with swelling, pain, and bleeding.\\n\\nACTIVITY\\nPlease refrain from any strenuous physical activity until your sutures have been removed. This includes lifting weights, going to the gym, or doing any type of stretching in the area the surgery was done. Any of these activities could cause your sutures to break and open your wound.\\n\\nALCOHOL\\nAvoid drinking alcohol for 48 hours following surgery, as alcohol increases the risk of bleeding.\\n\\nSMOKING\\nTo promote better healing and reduce the chance of complications, it is STRONGLY RECOMMENDED THAT YOU REFRAIN FROM SMOKING until the sutures are removed.\\n\\nSHOWERING\\nUnless instructed otherwise, you may resume showering 24 hours after surgery. \\n\\nSUTURE REMOVAL\\nWhen wounds are sutured, there are generally two layers of stitches placed. There are invisible stitches under the skin and visible ones above the skin. Stitches above the skin are removed in 1-2 weeks as instructed. Stitches under the skin absorb on their own in 8 weeks to 6 months depending on the type of material used. Occasionally, one of the stitches under the skin may work itself through the skin before being absorbed. If this occurs, call the office so we can remove this “spitting” deep suture.\\n\\nFOLLOW-UP\\nIt is imperative that you have routine follow-up with your dermatologist for skin checks. This should be arranged through your dermatologist’s office. \\n\\n\\nIf Home Health was recommended for your wound, you may contact them within 24 hours if you have not heard from them contact [facility] [phone].\\n\\n\\nIf you have any questions or concerns regarding your surgery, please call the office at \\[phone]. Post-Care Instructions: MOHS/ SURGERY POST-OPERATIVE INSTRUCTIONS\\n\\nWOUND CARE\\nKeep the bandage dry until 24 hours after surgery. Thereafter, change the bandage twice a day until sutures are removed. You may soak the bandage to help it peel off. Gently clean the wound with a Dial bar soap and water, and then gently pat the wound dry. Gently apply a thick layer of Vaseline to the wound, or Mupirocin if it has been prescribed to you. Cover the wound with a Band-Aid or non-stick gauze (e.g. Telfa®), and paper tape. Repeat this process daily until sutures are removed.\\n\\nWe do not recommend using over-the-counter antibiotic ointment given the high chance of developing allergic reactions in covered surgical wounds. Do not apply alcohol or hydrogen peroxide directly to the wound. \\n\\n \\n\\nFor hands, wrists, and forearms:\\nAfter surgery, you will be in a bulky dressing (bandage) with a velcro splint that goes from the hand to the middle of the forearm, with the fingers free. The splint is similar to a cast, however; the purpose of this splint is to decrease the mobility of your hand to prevent over working incision site. The splint protects the incision and the surgical repair, as well as lessen swelling. The splint can be removed and must be kept dry. When showering or bathing, remove bandage and the splint and refer to post op instructions for wound care.  Elevate your hand above your heart as much as possible to lessen swelling and pain. Pillows and blankets under the arm are helpful when you go to sleep.\\n\\n\\nBLEEDING\\nIt is fairly common for the incision to ooze or bleed, especially in the first several hours after surgery. This can be controlled by removing the bandage we applied and then applying constant, direct pressure to the bleeding site with a clean bandage or paper towel for 20 minutes (without peeking). If the bleeding continues, repeat the above procedure for an additional 20 minutes. If the incision continues to bleed after this time, call the office at [phone]. \\n\\nDISCOMFORT\\nIf you have discomfort following surgery, take two Extra Strength Tylenol® (total of 1,000 mg) every 6 hours OR a prescription pain medication if one has been prescribed to you. If this is not sufficient to control the pain, please call the office. AVOID medications that contain aspirin, ibuprofen, or naproxyn (e.g. Alleve®, Excedrin®, Bufferin®), as these products increase the risk of bleeding.\\n\\nSWELLING\\nLocal swelling is common after surgery. Apply an ice pack over the dressing – on for 20 minutes and off for 1 hour. Repeat until bedtime. You may continue this, if necessary, as long as the swelling persists. This will help with swelling, pain, and bleeding.\\n\\nACTIVITY\\nPlease refrain from any strenuous physical activity until your sutures have been removed. This includes lifting weights, going to the gym, or doing any type of stretching in the area the surgery was done. Any of these activities could cause your sutures to break and open your wound.\\n\\nALCOHOL\\nAvoid drinking alcohol for 48 hours following surgery, as alcohol increases the risk of bleeding.\\n\\nSMOKING\\nTo promote better healing and reduce the chance of complications, it is STRONGLY RECOMMENDED THAT YOU REFRAIN FROM SMOKING until the sutures are removed.\\n\\nSHOWERING\\nUnless instructed otherwise, you may resume showering 24 hours after surgery. \\n\\nSUTURE REMOVAL\\nWhen wounds are sutured, there are generally two layers of stitches placed. There are invisible stitches under the skin and visible ones above the skin. Stitches above the skin are removed in 1-2 weeks as instructed. Stitches under the skin absorb on their own in 8 weeks to 6 months depending on the type of material used. Occasionally, one of the stitches under the skin may work itself through the skin before being absorbed. If this occurs, call the office so we can remove this “spitting” deep suture.\\n\\nFOLLOW-UP\\nIt is imperative that you have routine follow-up with your dermatologist for skin checks. This should be arranged through your dermatologist’s office. \\n\\n\\nIf Home Health was recommended for your wound, you may contact them within 24 hours if you have not heard from them contact [facility] [phone].\\n\\n\\nIf you have any questions or concerns regarding your surgery, please call the office at [phone].

## 2023-01-12 NOTE — PROCEDURE: MOHS SURGERY
What Type Of Note Output Would You Prefer (Optional)?: Standard Output How Severe Are Your Spot(S)?: mild Have Your Spot(S) Been Treated In The Past?: has not been treated Hpi Title: Evaluation of Skin Lesions Year Removed: 1900 Zygomaticofacial Flap Text: Given the location of the defect, shape of the defect and the proximity to free margins a zygomaticofacial flap was deemed most appropriate for repair.  Using a sterile surgical marker, the appropriate flap was drawn incorporating the defect and placing the expected incisions within the relaxed skin tension lines where possible. The area thus outlined was incised deep to adipose tissue with a #15 scalpel blade with preservation of a vascular pedicle.  The skin margins were undermined to an appropriate distance in all directions utilizing iris scissors.  The flap was then placed into the defect and anchored with interrupted buried subcutaneous sutures.

## 2023-01-12 NOTE — PROCEDURE: MOHS SURGERY

## 2023-01-12 NOTE — PROCEDURE: MOHS SURGERY
Statement Selected Area L Indication Text: Tumors in this location are included in Area L (trunk and extremities).  Mohs surgery is indicated for larger tumors, tumors with aggressive histologic features, recurrent tumors, or tumors previously excised with positive margins in these anatomic locations.\\n\\n<b>Detailed documentation of histology, including histopathologic findings on microscopic examination of the tumor during today's Mohs procedure, are notated along with the Mohs maps from each stage of Mohs micrographic surgery.</b>

## 2023-01-24 ENCOUNTER — APPOINTMENT (RX ONLY)
Dept: URBAN - METROPOLITAN AREA CLINIC 149 | Facility: CLINIC | Age: 78
Setting detail: DERMATOLOGY
End: 2023-01-24

## 2023-01-24 DIAGNOSIS — Z48.02 ENCOUNTER FOR REMOVAL OF SUTURES: ICD-10-CM

## 2023-01-24 DIAGNOSIS — L57.0 ACTINIC KERATOSIS: ICD-10-CM

## 2023-01-24 PROCEDURE — 17000 DESTRUCT PREMALG LESION: CPT | Mod: 79

## 2023-01-24 PROCEDURE — 99024 POSTOP FOLLOW-UP VISIT: CPT

## 2023-01-24 PROCEDURE — ? SUTURE REMOVAL (GLOBAL PERIOD)

## 2023-01-24 PROCEDURE — ? COUNSELING

## 2023-01-24 PROCEDURE — ? LIQUID NITROGEN

## 2023-01-24 ASSESSMENT — LOCATION DETAILED DESCRIPTION DERM
LOCATION DETAILED: RIGHT SUPERIOR MEDIAL FOREHEAD
LOCATION DETAILED: LEFT CENTRAL FRONTAL SCALP

## 2023-01-24 ASSESSMENT — LOCATION ZONE DERM
LOCATION ZONE: FACE
LOCATION ZONE: SCALP

## 2023-01-24 ASSESSMENT — LOCATION SIMPLE DESCRIPTION DERM
LOCATION SIMPLE: LEFT SCALP
LOCATION SIMPLE: RIGHT FOREHEAD

## 2023-01-24 NOTE — PROCEDURE: SUTURE REMOVAL (GLOBAL PERIOD)
Detail Level: Zone
Add 07615 Cpt? (Important Note: In 2017 The Use Of 98772 Is Being Tracked By Cms To Determine Future Global Period Reimbursement For Global Periods): yes

## 2023-02-07 ENCOUNTER — APPOINTMENT (RX ONLY)
Dept: URBAN - METROPOLITAN AREA CLINIC 149 | Facility: CLINIC | Age: 78
Setting detail: DERMATOLOGY
End: 2023-02-07

## 2023-02-07 DIAGNOSIS — L57.0 ACTINIC KERATOSIS: ICD-10-CM

## 2023-02-07 PROCEDURE — ? PDT: BLUE

## 2023-02-07 PROCEDURE — ? COUNSELING

## 2023-02-07 PROCEDURE — 96574 DBRDMT PRMLG LES W/PDT: CPT

## 2023-02-07 ASSESSMENT — LOCATION ZONE DERM: LOCATION ZONE: SCALP

## 2023-02-07 ASSESSMENT — LOCATION DETAILED DESCRIPTION DERM: LOCATION DETAILED: LEFT MEDIAL FRONTAL SCALP

## 2023-02-07 ASSESSMENT — LOCATION SIMPLE DESCRIPTION DERM: LOCATION SIMPLE: LEFT SCALP

## 2023-02-07 NOTE — PROCEDURE: PDT: BLUE
Who Performed The Pdt?: Performed by MD FOREST, SABINE or SHYAM with Pre-Procedure Debridement of Hyperkeratotic Lesions (31866) Who Performed The Pdt?: Performed by MD FOREST, SABINE or SHYAM with Pre-Procedure Debridement of Hyperkeratotic Lesions (01565)